# Patient Record
Sex: FEMALE | Race: BLACK OR AFRICAN AMERICAN | Employment: UNEMPLOYED | URBAN - METROPOLITAN AREA
[De-identification: names, ages, dates, MRNs, and addresses within clinical notes are randomized per-mention and may not be internally consistent; named-entity substitution may affect disease eponyms.]

---
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
## 2020-03-30 ENCOUNTER — HOSPITAL ENCOUNTER (EMERGENCY)
Age: 20
Discharge: HOME | End: 2020-03-30
Attending: EMERGENCY MEDICINE
Payer: SELF-PAY

## 2020-03-30 VITALS
HEART RATE: 93 BPM | OXYGEN SATURATION: 100 % | BODY MASS INDEX: 39.27 KG/M2 | TEMPERATURE: 97.9 F | HEIGHT: 64 IN | RESPIRATION RATE: 16 BRPM | WEIGHT: 230 LBS | SYSTOLIC BLOOD PRESSURE: 151 MMHG | DIASTOLIC BLOOD PRESSURE: 83 MMHG

## 2020-03-30 DIAGNOSIS — R82.81 PYURIA: Primary | ICD-10-CM

## 2020-03-30 LAB
BACTERIA URNS QL MICRO: 0 /HPF
CASTS URNS QL MICRO: ABNORMAL /LPF
EPI CELLS #/AREA URNS HPF: ABNORMAL /HPF
HCG UR QL: NEGATIVE
RBC #/AREA URNS HPF: >100 /HPF
WBC URNS QL MICRO: >100 /HPF

## 2020-03-30 PROCEDURE — 81015 MICROSCOPIC EXAM OF URINE: CPT

## 2020-03-30 PROCEDURE — 81003 URINALYSIS AUTO W/O SCOPE: CPT

## 2020-03-30 PROCEDURE — 81025 URINE PREGNANCY TEST: CPT

## 2020-03-30 PROCEDURE — 99283 EMERGENCY DEPT VISIT LOW MDM: CPT

## 2020-03-30 RX ORDER — CEPHALEXIN 500 MG/1
500 CAPSULE ORAL 3 TIMES DAILY
Qty: 21 CAP | Refills: 0 | Status: SHIPPED | OUTPATIENT
Start: 2020-03-30 | End: 2020-04-06

## 2020-03-30 RX ORDER — PHENAZOPYRIDINE HYDROCHLORIDE 200 MG/1
200 TABLET, FILM COATED ORAL 3 TIMES DAILY
Qty: 6 TAB | Refills: 0 | Status: SHIPPED | OUTPATIENT
Start: 2020-03-30 | End: 2020-04-01

## 2020-03-31 NOTE — DISCHARGE INSTRUCTIONS
Plenty of fluids  Pyridium for discomfort on voiding  Antibiotic: Keflex  Continued work-up if do not improve over the next several days

## 2020-03-31 NOTE — ED NOTES
I have reviewed discharge instructions with the patient. The patient verbalized understanding. Patient left ED via Discharge Method: ambulatory to Home with self. Opportunity for questions and clarification provided. Patient given 2 scripts. To continue your aftercare when you leave the hospital, you may receive an automated call from our care team to check in on how you are doing. This is a free service and part of our promise to provide the best care and service to meet your aftercare needs.  If you have questions, or wish to unsubscribe from this service please call [phone]. Thank you for Choosing our [facility] Emergency Department.

## 2020-03-31 NOTE — ED PROVIDER NOTES
The last 2 days she has had some discomfort and frequency associated with voiding. No associated fever. No flank pain. No history of recurring urinary tract infections. Denies any gynecologic issues. No concern of possible STD. No vaginal discharge. Delivered last fall is not breast-feeding. Had a normal menstrual period at the beginning of March. The history is provided by the patient. Bladder Infection    This is a new problem. There has been no fever. Associated symptoms include frequency and urgency. Pertinent negatives include no chills, no nausea, no vomiting, no hematuria, no hesitancy, no possible pregnancy and no flank pain. Her past medical history does not include kidney stones or recurrent UTIs. Past Medical History:   Diagnosis Date    DM [facility]diabetes mellitus) ([facility] [address]     Migraines     Pneumonia        Past Surgical History:   Procedure Laterality Date    HX HEENT      T&A/ tubes in ears         History reviewed. No pertinent family history.     Social History     Socioeconomic History    Marital status: SINGLE     Spouse name: Not on file    Number of children: Not on file    Years of education: Not on file    Highest education level: Not on file   Occupational History    Not on file   Social Needs    Financial resource strain: Not on file    Food insecurity     Worry: Not on file     Inability: Not on file    Transportation needs     Medical: Not on file     Non-medical: Not on file   Tobacco Use    Smoking status: Never Smoker    Smokeless tobacco: Never Used   Substance and Sexual Activity    Alcohol use: No    Drug use: No    Sexual activity: Not on file   Lifestyle    Physical activity     Days per week: Not on file     Minutes per session: Not on file    Stress: Not on file   Relationships    Social connections     Talks on phone: Not on file     Gets together: Not on file     Attends Sabianism service: Not on file     Active member of club or organization: Not on file     Attends meetings of clubs or organizations: Not on file     Relationship status: Not on file    Intimate partner violence     Fear of current or ex partner: Not on file     Emotionally abused: Not on file     Physically abused: Not on file     Forced sexual activity: Not on file   Other Topics Concern    Not on file   Social History Narrative    Not on file         ALLERGIES: Naproxen    Review of Systems   Constitutional: Negative for chills and fever. HENT: Negative. Gastrointestinal: Negative for nausea and vomiting. Genitourinary: Positive for frequency and urgency. Negative for flank pain, hematuria and hesitancy. Musculoskeletal: Negative. Neurological: Negative. Psychiatric/Behavioral: Negative for behavioral problems and confusion. All other systems reviewed and are negative. Vitals:    03/30/20 2019   BP: 151/83   Pulse: 93   Resp: 16   Temp: 97.9 °F (36.6 °C)   SpO2: 100%   Weight: 104.3 kg (230 lb)   Height: 5' 4\" (1.626 m)            Physical Exam  Vitals signs and nursing note reviewed. Constitutional:       General: She is not in acute distress. Appearance: Normal appearance. She is well-developed. She is not ill-appearing, toxic-appearing or diaphoretic. HENT:      Head: Atraumatic. Eyes:      General: No scleral icterus. Neck:      Musculoskeletal: Neck supple. Cardiovascular:      Rate and Rhythm: Normal rate. Pulmonary:      Effort: Pulmonary effort is normal. No respiratory distress. Abdominal:      General: Bowel sounds are normal.      Palpations: Abdomen is soft. Tenderness: There is no right CVA tenderness or left CVA tenderness. Musculoskeletal: Normal range of motion. Skin:     General: Skin is warm and dry. Neurological:      General: No focal deficit present. Mental Status: She is alert. Psychiatric:         Thought Content:  Thought content normal.          MDM  Number of Diagnoses or Management Options  Pyuria: Diagnosis management comments: Dysuria with hematuria and pyuria.  Will cover infectious potential though no bacteria noted   Aware to recheck with worsening       Amount and/or Complexity of Data Reviewed  Clinical lab tests: reviewed and ordered  Decide to obtain previous medical records or to obtain history from someone other than the patient: yes    Risk of Complications, Morbidity, and/or Mortality  Presenting problems: moderate  Diagnostic procedures: low  Management options: moderate    Patient Progress  Patient progress: stable         Procedures      Recent Results (from the past 12 hour(s))   HCG URINE, QL. - POC    Collection Time: 03/30/20  8:42 PM   Result Value Ref Range    Pregnancy test,urine (POC) NEGATIVE  NEG     URINE MICROSCOPIC    Collection Time: 03/30/20  8:43 PM   Result Value Ref Range    WBC >100 (H) 0 /hpf    RBC >100 (H) 0 /hpf    Epithelial cells 3-5 0 /hpf    Bacteria 0 0 /hpf    Casts 3-5 0 /lpf

## 2020-03-31 NOTE — ED TRIAGE NOTES
Pt c/o lower back pain and increased urinary frequency x3 days. Pt denies any burning or pain during urination.

## 2021-10-25 ENCOUNTER — APPOINTMENT (OUTPATIENT)
Dept: ULTRASOUND IMAGING | Age: 21
End: 2021-10-25
Attending: PHYSICIAN ASSISTANT

## 2021-10-25 ENCOUNTER — HOSPITAL ENCOUNTER (EMERGENCY)
Age: 21
Discharge: HOME | End: 2021-10-25
Attending: EMERGENCY MEDICINE

## 2021-10-25 VITALS
WEIGHT: 233 LBS | BODY MASS INDEX: 39.78 KG/M2 | OXYGEN SATURATION: 99 % | SYSTOLIC BLOOD PRESSURE: 142 MMHG | TEMPERATURE: 98.4 F | HEIGHT: 64 IN | DIASTOLIC BLOOD PRESSURE: 78 MMHG | RESPIRATION RATE: 16 BRPM | HEART RATE: 78 BPM

## 2021-10-25 DIAGNOSIS — R51.9 NONINTRACTABLE HEADACHE, UNSPECIFIED CHRONICITY PATTERN, UNSPECIFIED HEADACHE TYPE: Primary | ICD-10-CM

## 2021-10-25 DIAGNOSIS — N93.9 VAGINAL BLEEDING: ICD-10-CM

## 2021-10-25 LAB
ABO + RH BLD: NORMAL
BACTERIA URNS QL MICRO: 0 /HPF
BASOPHILS # BLD: 0 K/UL (ref 0–0.2)
BASOPHILS NFR BLD: 0 % (ref 0–2)
CASTS URNS QL MICRO: NORMAL /LPF
DIFFERENTIAL METHOD BLD: ABNORMAL
EOSINOPHIL # BLD: 0 K/UL (ref 0–0.8)
EOSINOPHIL NFR BLD: 0 % (ref 0.5–7.8)
EPI CELLS #/AREA URNS HPF: NORMAL /HPF
ERYTHROCYTE [DISTWIDTH] IN BLOOD BY AUTOMATED COUNT: 14.1 % (ref 11.9–14.6)
GLUCOSE BLD STRIP.AUTO-MCNC: 152 MG/DL (ref 65–100)
HCG SERPL-ACNC: <1 MIU/ML (ref 0–6)
HCT VFR BLD AUTO: 40.2 % (ref 35.8–46.3)
HGB BLD-MCNC: 13.1 G/DL (ref 11.7–15.4)
IMM GRANULOCYTES # BLD AUTO: 0 K/UL (ref 0–0.5)
IMM GRANULOCYTES NFR BLD AUTO: 0 % (ref 0–5)
LYMPHOCYTES # BLD: 2.1 K/UL (ref 0.5–4.6)
LYMPHOCYTES NFR BLD: 56 % (ref 13–44)
MCH RBC QN AUTO: 26.1 PG (ref 26.1–32.9)
MCHC RBC AUTO-ENTMCNC: 32.6 G/DL (ref 31.4–35)
MCV RBC AUTO: 80.2 FL (ref 79.6–97.8)
MONOCYTES # BLD: 0.2 K/UL (ref 0.1–1.3)
MONOCYTES NFR BLD: 5 % (ref 4–12)
NEUTS SEG # BLD: 1.5 K/UL (ref 1.7–8.2)
NEUTS SEG NFR BLD: 39 % (ref 43–78)
NRBC # BLD: 0 K/UL (ref 0–0.2)
PLATELET # BLD AUTO: 387 K/UL (ref 150–450)
PMV BLD AUTO: 11.3 FL (ref 9.4–12.3)
RBC # BLD AUTO: 5.01 M/UL (ref 4.05–5.2)
RBC #/AREA URNS HPF: NORMAL /HPF
SERVICE CMNT-IMP: ABNORMAL
WBC # BLD AUTO: 3.7 K/UL (ref 4.3–11.1)
WBC URNS QL MICRO: NORMAL /HPF

## 2021-10-25 PROCEDURE — 99284 EMERGENCY DEPT VISIT MOD MDM: CPT

## 2021-10-25 PROCEDURE — 86900 BLOOD TYPING SEROLOGIC ABO: CPT

## 2021-10-25 PROCEDURE — 84702 CHORIONIC GONADOTROPIN TEST: CPT

## 2021-10-25 PROCEDURE — 81015 MICROSCOPIC EXAM OF URINE: CPT

## 2021-10-25 PROCEDURE — 81003 URINALYSIS AUTO W/O SCOPE: CPT

## 2021-10-25 PROCEDURE — 82962 GLUCOSE BLOOD TEST: CPT

## 2021-10-25 PROCEDURE — 85025 COMPLETE CBC W/AUTO DIFF WBC: CPT

## 2021-10-25 NOTE — ED NOTES
I have reviewed discharge instructions with the patient. The patient verbalized understanding. Patient left ED via Discharge Method: ambulatory to Home with herself. Opportunity for questions and clarification provided. Patient given 0 scripts. To continue your aftercare when you leave the hospital, you may receive an automated call from our care team to check in on how you are doing. This is a free service and part of our promise to provide the best care and service to meet your aftercare needs.  If you have questions, or wish to unsubscribe from this service please call [phone]. Thank you for Choosing our [address] [place] [address] Emergency Department.

## 2021-10-25 NOTE — ED TRIAGE NOTES
C/o migraine this am- took meds with out relief.  Also states that she took a pregnancy test yesterday that was pos- 4 days late- started this am

## 2021-10-25 NOTE — DISCHARGE INSTRUCTIONS
Use tylenol as directed for headache see your primary care for routine recheck, avoid any unprotected sex

## 2021-10-25 NOTE — ED PROVIDER NOTES
Patient to ER complaining of migraine headache started this morning she took Tylenol with no relief. She states last menses 1 month ago it was 4 days late she had a positive pregnancy test couple days ago. She is . She denies any fever no nausea vomiting or diarrhea she has diabetic blood sugar has been well in the low 100s. She denies any abdominal pain    The history is provided by the patient. Headache   This is a recurrent problem. The current episode started 12 to 24 hours ago. The problem occurs constantly. The problem has not changed since onset. The headache is aggravated by an unknown factor. The pain is located in the generalized region. The pain is at a severity of 7/10. The pain is mild. Pertinent negatives include no fever, no weakness and no dizziness. She has tried acetaminophen for the symptoms. The treatment provided mild relief. Vaginal Bleeding  This is a new problem. The current episode started 6 to 12 hours ago. The problem occurs rarely. The problem has not changed since onset. Associated symptoms include headaches. Nothing aggravates the symptoms. Nothing relieves the symptoms. She has tried nothing for the symptoms. The treatment provided no relief. Past Medical History:   Diagnosis Date    DM (diabetes mellitus) ([facility] [address]     Migraines     Pneumonia        Past Surgical History:   Procedure Laterality Date    HX HEENT      T&A/ tubes in ears         No family history on file.     Social History     Socioeconomic History    Marital status: SINGLE     Spouse name: Not on file    Number of children: Not on file    Years of education: Not on file    Highest education level: Not on file   Occupational History    Not on file   Tobacco Use    Smoking status: Never Smoker    Smokeless tobacco: Never Used   Substance and Sexual Activity    Alcohol use: No    Drug use: No    Sexual activity: Not on file   Other Topics Concern    Not on file   Social History Narrative    Not on file     Social Determinants of Health     Financial Resource Strain:     Difficulty of Paying Living Expenses:    Food Insecurity:     Worried About Running Out of Food in the Last Year:     [address] in the Last Year:    Transportation Needs:     Lack of Transportation (Medical):  Lack of Transportation (Non-Medical):    Physical Activity:     Days of Exercise per Week:     Minutes of Exercise per Session:    Stress:     Feeling of Stress :    Social Connections:     Frequency of Communication with Friends and Family:     Frequency of Social Gatherings with Friends and Family:     Attends [employer] Services:     Active Member of Clubs or Organizations:     Attends Club or Organization Meetings:     Marital Status:    Intimate Partner Violence:     Fear of Current or Ex-Partner:     Emotionally Abused:     Physically Abused:     Sexually Abused: ALLERGIES: Naproxen    Review of Systems   Constitutional: Negative for fever. Genitourinary: Positive for vaginal bleeding. Neurological: Positive for headaches. Negative for dizziness and weakness. All other systems reviewed and are negative. Vitals:    10/25/21 1324   BP: (!) 147/84   Pulse: 85   Resp: 18   Temp: 98.8 °F (37.1 °C)   SpO2: 100%   Weight: 105.7 kg (233 lb)   Height: 5' 4\" (1.626 m)            Physical Exam  Vitals and nursing note reviewed. Constitutional:       General: She is not in acute distress. Appearance: Normal appearance. She is well-developed. She is obese. She is not diaphoretic. HENT:      Head: Normocephalic and atraumatic. Right Ear: External ear normal.      Left Ear: External ear normal.   Eyes:      Pupils: Pupils are equal, round, and reactive to light. Cardiovascular:      Rate and Rhythm: Normal rate and regular rhythm. Pulmonary:      Effort: Pulmonary effort is normal.      Breath sounds: Normal breath sounds.    Abdominal:      General: Bowel sounds are normal. Palpations: Abdomen is soft. Tenderness: There is no abdominal tenderness. Hernia: No hernia is present. Musculoskeletal:         General: Normal range of motion. Cervical back: Normal range of motion and neck supple. Skin:     General: Skin is warm. Neurological:      General: No focal deficit present. Mental Status: She is alert and oriented to person, place, and time. MDM  Number of Diagnoses or Management Options  Diagnosis management comments: Labs Reviewed  CBC WITH AUTOMATED DIFF - Abnormal; Notable for the following components:     WBC                           3.7 (*)                NEUTROPHILS                   39 (*)                 LYMPHOCYTES                   56 (*)                 EOSINOPHILS                   0 (*)                  ABS.  NEUTROPHILS              1.5 (*)             All other components within normal limits  GLUCOSE, POC - Abnormal; Notable for the following components:     Glucose (POC)                 152 (*)             All other components within normal limits  BETA HCG, QT  URINE MICROSCOPIC  BLOOD TYPE, (ABO+RH)      Patient unlikely starting her period, has to see primary care for routine recheck will give ibuprofen for headache       Amount and/or Complexity of Data Reviewed  Clinical lab tests: ordered and reviewed  Review and summarize past medical records: yes    Risk of Complications, Morbidity, and/or Mortality  Presenting problems: moderate  Diagnostic procedures: moderate  Management options: low    Patient Progress  Patient progress: improved         Procedures

## 2022-03-21 ENCOUNTER — TRANSCRIBE ORDER (OUTPATIENT)
Dept: SCHEDULING | Age: 22
End: 2022-03-21

## 2022-03-21 DIAGNOSIS — N63.10 MASS OF RIGHT BREAST: Primary | ICD-10-CM

## 2022-03-23 ENCOUNTER — TRANSCRIBE ORDER (OUTPATIENT)
Dept: SCHEDULING | Age: 22
End: 2022-03-23

## 2022-03-23 DIAGNOSIS — N63.0 LUMP OR MASS IN BREAST: Primary | ICD-10-CM

## 2022-04-12 ENCOUNTER — HOSPITAL ENCOUNTER (OUTPATIENT)
Dept: MAMMOGRAPHY | Age: 22
Discharge: HOME | End: 2022-04-12
Attending: INTERNAL MEDICINE
Payer: COMMERCIAL

## 2022-04-12 DIAGNOSIS — N63.0 LUMP OR MASS IN BREAST: ICD-10-CM

## 2022-04-12 PROCEDURE — 76642 ULTRASOUND BREAST LIMITED: CPT

## 2022-12-13 ENCOUNTER — HOSPITAL ENCOUNTER (EMERGENCY)
Dept: GENERAL RADIOLOGY | Age: 22
Discharge: HOME | End: 2022-12-16

## 2022-12-13 ENCOUNTER — HOSPITAL ENCOUNTER (EMERGENCY)
Age: 22
Discharge: HOME | End: 2022-12-13
Attending: EMERGENCY MEDICINE | Admitting: EMERGENCY MEDICINE

## 2022-12-13 VITALS
OXYGEN SATURATION: 100 % | BODY MASS INDEX: 39.27 KG/M2 | HEART RATE: 90 BPM | HEIGHT: 64 IN | WEIGHT: 230 LBS | DIASTOLIC BLOOD PRESSURE: 75 MMHG | TEMPERATURE: 98.2 F | SYSTOLIC BLOOD PRESSURE: 129 MMHG | RESPIRATION RATE: 16 BRPM

## 2022-12-13 DIAGNOSIS — U07.1 COVID-19 VIRUS INFECTION: Primary | ICD-10-CM

## 2022-12-13 DIAGNOSIS — R19.7 NAUSEA VOMITING AND DIARRHEA: ICD-10-CM

## 2022-12-13 DIAGNOSIS — R11.2 NAUSEA VOMITING AND DIARRHEA: ICD-10-CM

## 2022-12-13 LAB
ALBUMIN SERPL-MCNC: 3.3 G/DL (ref 3.5–5)
ALBUMIN/GLOB SERPL: 0.8 {RATIO} (ref 0.4–1.6)
ALP SERPL-CCNC: 70 U/L (ref 50–130)
ALT SERPL-CCNC: 22 U/L (ref 12–65)
ANION GAP SERPL CALC-SCNC: 7 MMOL/L (ref 2–11)
AST SERPL-CCNC: 20 U/L (ref 15–37)
BASOPHILS # BLD: 0 K/UL (ref 0–0.2)
BASOPHILS NFR BLD: 0 % (ref 0–2)
BILIRUB SERPL-MCNC: 0.5 MG/DL (ref 0.2–1.1)
BUN SERPL-MCNC: 11 MG/DL (ref 6–23)
CALCIUM SERPL-MCNC: 9.1 MG/DL (ref 8.3–10.4)
CHLORIDE SERPL-SCNC: 106 MMOL/L (ref 101–110)
CO2 SERPL-SCNC: 25 MMOL/L (ref 21–32)
CREAT SERPL-MCNC: 0.77 MG/DL (ref 0.6–1)
DIFFERENTIAL METHOD BLD: ABNORMAL
EOSINOPHIL # BLD: 0 K/UL (ref 0–0.8)
EOSINOPHIL NFR BLD: 0 % (ref 0.5–7.8)
ERYTHROCYTE [DISTWIDTH] IN BLOOD BY AUTOMATED COUNT: 13.3 % (ref 11.9–14.6)
FLUAV AG NPH QL IA: NEGATIVE
FLUBV AG NPH QL IA: NEGATIVE
GLOBULIN SER CALC-MCNC: 4 G/DL (ref 2.8–4.5)
GLUCOSE SERPL-MCNC: 220 MG/DL (ref 65–100)
HCG UR QL: NEGATIVE
HCT VFR BLD AUTO: 39.7 % (ref 35.8–46.3)
HGB BLD-MCNC: 12.7 G/DL (ref 11.7–15.4)
IMM GRANULOCYTES # BLD AUTO: 0 K/UL (ref 0–0.5)
IMM GRANULOCYTES NFR BLD AUTO: 0 % (ref 0–5)
LIPASE SERPL-CCNC: 155 U/L (ref 73–393)
LYMPHOCYTES # BLD: 2 K/UL (ref 0.5–4.6)
LYMPHOCYTES NFR BLD: 41 % (ref 13–44)
MAGNESIUM SERPL-MCNC: 2 MG/DL (ref 1.8–2.4)
MCH RBC QN AUTO: 26.1 PG (ref 26.1–32.9)
MCHC RBC AUTO-ENTMCNC: 32 G/DL (ref 31.4–35)
MCV RBC AUTO: 81.5 FL (ref 82–102)
MONOCYTES # BLD: 0.3 K/UL (ref 0.1–1.3)
MONOCYTES NFR BLD: 6 % (ref 4–12)
NEUTS SEG # BLD: 2.6 K/UL (ref 1.7–8.2)
NEUTS SEG NFR BLD: 52 % (ref 43–78)
NRBC # BLD: 0 K/UL (ref 0–0.2)
PLATELET # BLD AUTO: 244 K/UL (ref 150–450)
PMV BLD AUTO: 10 FL (ref 9.4–12.3)
POTASSIUM SERPL-SCNC: 4 MMOL/L (ref 3.5–5.1)
PROT SERPL-MCNC: 7.3 G/DL (ref 6.3–8.2)
RBC # BLD AUTO: 4.87 M/UL (ref 4.05–5.2)
SARS-COV-2 RDRP RESP QL NAA+PROBE: DETECTED
SODIUM SERPL-SCNC: 138 MMOL/L (ref 133–143)
SOURCE: ABNORMAL
SPECIMEN SOURCE: NORMAL
WBC # BLD AUTO: 4.9 K/UL (ref 4.3–11.1)

## 2022-12-13 PROCEDURE — 6360000002 HC RX W HCPCS: Performed by: NURSE PRACTITIONER

## 2022-12-13 PROCEDURE — 99284 EMERGENCY DEPT VISIT MOD MDM: CPT

## 2022-12-13 PROCEDURE — 87635 SARS-COV-2 COVID-19 AMP PRB: CPT

## 2022-12-13 PROCEDURE — 80053 COMPREHEN METABOLIC PANEL: CPT

## 2022-12-13 PROCEDURE — 2580000003 HC RX 258: Performed by: NURSE PRACTITIONER

## 2022-12-13 PROCEDURE — 6370000000 HC RX 637 (ALT 250 FOR IP): Performed by: NURSE PRACTITIONER

## 2022-12-13 PROCEDURE — 81025 URINE PREGNANCY TEST: CPT

## 2022-12-13 PROCEDURE — 96374 THER/PROPH/DIAG INJ IV PUSH: CPT

## 2022-12-13 PROCEDURE — 87804 INFLUENZA ASSAY W/OPTIC: CPT

## 2022-12-13 PROCEDURE — 71046 X-RAY EXAM CHEST 2 VIEWS: CPT

## 2022-12-13 PROCEDURE — 83735 ASSAY OF MAGNESIUM: CPT

## 2022-12-13 PROCEDURE — 83690 ASSAY OF LIPASE: CPT

## 2022-12-13 PROCEDURE — 85025 COMPLETE CBC W/AUTO DIFF WBC: CPT

## 2022-12-13 RX ORDER — LOPERAMIDE HYDROCHLORIDE 2 MG/1
2 TABLET ORAL 4 TIMES DAILY PRN
Qty: 12 TABLET | Refills: 0 | Status: SHIPPED | OUTPATIENT
Start: 2022-12-13 | End: 2022-12-16

## 2022-12-13 RX ORDER — LOPERAMIDE HYDROCHLORIDE 2 MG/1
2 CAPSULE ORAL
Status: COMPLETED | OUTPATIENT
Start: 2022-12-13 | End: 2022-12-13

## 2022-12-13 RX ORDER — ONDANSETRON 4 MG/1
4 TABLET, FILM COATED ORAL 3 TIMES DAILY PRN
Qty: 15 TABLET | Refills: 0 | Status: SHIPPED | OUTPATIENT
Start: 2022-12-13

## 2022-12-13 RX ORDER — ONDANSETRON 2 MG/ML
4 INJECTION INTRAMUSCULAR; INTRAVENOUS
Status: COMPLETED | OUTPATIENT
Start: 2022-12-13 | End: 2022-12-13

## 2022-12-13 RX ORDER — 0.9 % SODIUM CHLORIDE 0.9 %
500 INTRAVENOUS SOLUTION INTRAVENOUS
Status: COMPLETED | OUTPATIENT
Start: 2022-12-13 | End: 2022-12-13

## 2022-12-13 RX ADMIN — ONDANSETRON 4 MG: 2 INJECTION INTRAMUSCULAR; INTRAVENOUS at 12:49

## 2022-12-13 RX ADMIN — LOPERAMIDE HYDROCHLORIDE 2 MG: 2 CAPSULE ORAL at 12:48

## 2022-12-13 RX ADMIN — SODIUM CHLORIDE 500 ML: 9 INJECTION, SOLUTION INTRAVENOUS at 12:42

## 2022-12-13 ASSESSMENT — ENCOUNTER SYMPTOMS
COUGH: 1
DIARRHEA: 1
SORE THROAT: 1
ABDOMINAL PAIN: 0
NAUSEA: 1

## 2022-12-13 ASSESSMENT — PAIN - FUNCTIONAL ASSESSMENT: PAIN_FUNCTIONAL_ASSESSMENT: 0-10

## 2022-12-13 ASSESSMENT — PAIN DESCRIPTION - LOCATION: LOCATION: HEAD

## 2022-12-13 ASSESSMENT — PAIN SCALES - GENERAL: PAINLEVEL_OUTOF10: 5

## 2022-12-13 NOTE — Clinical Note
Rocio Kruse was seen and treated in our emergency department on 12/13/2022. COVID19 virus is suspected. Per the CDC guidelines we recommend home isolation until the following conditions are all met:    1. At least five days have passed since symptoms first appeared and/or had a close exposure,   2. After home isolation for five days, wearing a mask around others for the next five days,  3. At least 24 have passed since last fever without the use of fever-reducing medications and  4. Symptoms (eg cough, shortness of breath) have improved    If you have any questions or concerns, please don't hesitate to call.     She may return to work/school on 12/17/2022        Vilma Mills, TONI - CNP

## 2022-12-13 NOTE — ED PROVIDER NOTES
Emergency Department Provider Note                   PCP:                Loy Myers MD               Age: 20 y.o. Sex: female       ICD-10-CM    1. COVID-19 virus infection  U07.1       2. Nausea vomiting and diarrhea  R11.2     R19.7           DISPOSITION Discharge - Pending Orders Complete 12/13/2022 12:30:13 PM        MDM  Number of Diagnoses or Management Options  COVID-19 virus infection: new, needed workup  Nausea vomiting and diarrhea: new, needed workup  Diagnosis management comments: Overall well-appearing 26-year-old female presents emergency department today with complaint of nausea, vomiting, diarrhea, and cough. Patient appears in no acute distress. Lung sounds are clear. Abdomen is soft and nontender. Doubt surgical abdomen. She is afebrile. Heart rate is normal.  PERC negative. Symptoms consistent with viral etiology. Will check labs, urine, COVID, flu, and chest x-ray today. Labs are unremarkable. Urine is negative for indication of infection. Flu is negative. COVID is positive. Chest x-ray is negative for acute process. Supportive treatment discussed and encouraged. Red flag symptoms and return precautions discussed. Patient verbalizes understanding agreement with instructions, treatment plan, discharge.        Amount and/or Complexity of Data Reviewed  Clinical lab tests: ordered and reviewed  Tests in the medicine section of CPT®: reviewed and ordered  Review and summarize past medical records: yes    Risk of Complications, Morbidity, and/or Mortality  Presenting problems: low  Diagnostic procedures: low    Patient Progress  Patient progress: improved             Orders Placed This Encounter   Procedures    COVID-19, Rapid    Rapid influenza A/B antigens    XR CHEST (2 VW)    CBC with Auto Differential    Comprehensive Metabolic Panel    Lipase    Magnesium    Continuous Pulse Oximetry    POCT Urine Dipstick    POC Urine Preg (Select for females age 6-55)    POC Pregnancy Urine Qual    Saline lock IV        Medications   ondansetron (ZOFRAN) injection 4 mg (has no administration in time range)   0.9 % sodium chloride bolus (has no administration in time range)   loperamide (IMODIUM) capsule 2 mg (has no administration in time range)       New Prescriptions    LOPERAMIDE (IMODIUM A-D) 2 MG TABLET    Take 1 tablet by mouth 4 times daily as needed for Diarrhea    ONDANSETRON (ZOFRAN) 4 MG TABLET    Take 1 tablet by mouth 3 times daily as needed for Nausea or Vomiting        Cecil Garcia is a 20 y.o. female who presents to the Emergency Department with chief complaint of    Chief Complaint   Patient presents with    Pharyngitis    Headache      54-year-old female who presents emergency department today with complaint of nausea, vomiting, diarrhea, sore throat, and cough for 3 days. Patient reports some right-sided chest pain that occurs with a cough. She denies any abdominal pain, shortness of breath, or known fevers. She has been taking some Mucinex for her symptoms without relief. The history is provided by the patient. Nausea & Vomiting  Severity:  Moderate  Chronicity:  New  Recent urination:  Normal  Relieved by:  None tried  Worsened by:  Nothing  Ineffective treatments:  None tried  Associated symptoms: cough, diarrhea and sore throat    Associated symptoms: no abdominal pain, no chills, no fever, no headaches and no myalgias        Review of Systems   Constitutional:  Negative for chills and fever. HENT:  Positive for sore throat. Respiratory:  Positive for cough. Gastrointestinal:  Positive for diarrhea and nausea. Negative for abdominal pain. Musculoskeletal:  Negative for myalgias. Neurological:  Negative for headaches. All other systems reviewed and are negative.     Past Medical History:   Diagnosis Date    DM (diabetes mellitus) ([facility] [address]     Migraines     Pneumonia         Past Surgical History:   Procedure Laterality Date    HEENT T&A/ tubes in ears        History reviewed. No pertinent family history. Social History     Socioeconomic History    Marital status: Single     Spouse name: None    Number of children: None    Years of education: None    Highest education level: None   Tobacco Use    Smoking status: Never    Smokeless tobacco: Never   Vaping Use    Vaping Use: Never used   Substance and Sexual Activity    Alcohol use: No    Drug use: No         Naproxen     Previous Medications    METFORMIN (GLUCOPHAGE) 500 MG TABLET    Take 750 mg by mouth 2 times daily (with meals)        Vitals signs and nursing note reviewed. Patient Vitals for the past 4 hrs:   Temp Pulse Resp BP SpO2   12/13/22 0910 98.2 °F (36.8 °C) 88 16 128/89 100 %          Physical Exam  Vitals and nursing note reviewed. Constitutional:       General: She is not in acute distress. Appearance: Normal appearance. She is well-developed. She is not ill-appearing, toxic-appearing or diaphoretic. HENT:      Head: Normocephalic and atraumatic. Mouth/Throat:      Mouth: Mucous membranes are moist.   Eyes:      General: No scleral icterus. Extraocular Movements: Extraocular movements intact. Cardiovascular:      Rate and Rhythm: Normal rate. Heart sounds: Normal heart sounds. Pulmonary:      Effort: Pulmonary effort is normal. No respiratory distress. Breath sounds: Normal breath sounds. Abdominal:      General: Abdomen is flat. Bowel sounds are normal. There is no distension. Palpations: Abdomen is soft. Tenderness: There is no abdominal tenderness. Skin:     General: Skin is warm and dry. Capillary Refill: Capillary refill takes less than 2 seconds. Neurological:      General: No focal deficit present. Mental Status: She is alert and oriented to person, place, and time.    Psychiatric:         Mood and Affect: Mood normal.         Behavior: Behavior normal.        Procedures    Results for orders placed or performed during the hospital encounter of 12/13/22   COVID-19, Rapid    Specimen: Nasopharyngeal   Result Value Ref Range    Source Nasopharyngeal      SARS-CoV-2, Rapid Detected (AA) NOTD     Rapid influenza A/B antigens    Specimen: Nasal Washing   Result Value Ref Range    Influenza A Ag Negative NEG      Influenza B Ag Negative NEG      Source Nasopharyngeal     XR CHEST (2 VW)    Narrative    Exam: XR CHEST (2 VW) on 12/13/2022 10:39 AM    Clinical History: The Female patient is 20years old  presenting for cough,  chest pain. Comparison:  Chest x-ray 6/14/2018    Findings:  Frontal and lateral views of the chest were obtained. Lungs are clear without focal infiltrate or consolidation. No pleural effusions  are seen. The cardiomediastinal silhouette is within normal limits. There are  no acute osseous abnormalities. Impression    1. No acute cardiopulmonary process.     CPT code(s) 74060           CBC with Auto Differential   Result Value Ref Range    WBC 4.9 4.3 - 11.1 K/uL    RBC 4.87 4.05 - 5.2 M/uL    Hemoglobin 12.7 11.7 - 15.4 g/dL    Hematocrit 39.7 35.8 - 46.3 %    MCV 81.5 (L) 82.0 - 102.0 FL    MCH 26.1 26.1 - 32.9 PG    MCHC 32.0 31.4 - 35.0 g/dL    RDW 13.3 11.9 - 14.6 %    Platelets 135 866 - 368 K/uL    MPV 10.0 9.4 - 12.3 FL    nRBC 0.00 0.0 - 0.2 K/uL    Differential Type AUTOMATED      Seg Neutrophils 52 43 - 78 %    Lymphocytes 41 13 - 44 %    Monocytes 6 4.0 - 12.0 %    Eosinophils % 0 (L) 0.5 - 7.8 %    Basophils 0 0.0 - 2.0 %    Immature Granulocytes 0 0.0 - 5.0 %    Segs Absolute 2.6 1.7 - 8.2 K/UL    Absolute Lymph # 2.0 0.5 - 4.6 K/UL    Absolute Mono # 0.3 0.1 - 1.3 K/UL    Absolute Eos # 0.0 0.0 - 0.8 K/UL    Basophils Absolute 0.0 0.0 - 0.2 K/UL    Absolute Immature Granulocyte 0.0 0.0 - 0.5 K/UL   Comprehensive Metabolic Panel   Result Value Ref Range    Sodium 138 133 - 143 mmol/L    Potassium 4.0 3.5 - 5.1 mmol/L    Chloride 106 101 - 110 mmol/L    CO2 25 21 - 32 mmol/L    Anion Gap 7 2 - 11 mmol/L    Glucose 220 (H) 65 - 100 mg/dL    BUN 11 6 - 23 MG/DL    Creatinine 0.77 0.6 - 1.0 MG/DL    Est, Glom Filt Rate >60 >60 ml/min/1.73m2    Calcium 9.1 8.3 - 10.4 MG/DL    Total Bilirubin 0.5 0.2 - 1.1 MG/DL    ALT 22 12 - 65 U/L    AST 20 15 - 37 U/L    Alk Phosphatase 70 50 - 130 U/L    Total Protein 7.3 6.3 - 8.2 g/dL    Albumin 3.3 (L) 3.5 - 5.0 g/dL    Globulin 4.0 2.8 - 4.5 g/dL    Albumin/Globulin Ratio 0.8 0.4 - 1.6     Lipase   Result Value Ref Range    Lipase 155 73 - 393 U/L   Magnesium   Result Value Ref Range    Magnesium 2.0 1.8 - 2.4 mg/dL   POC Pregnancy Urine Qual   Result Value Ref Range    Preg Test, Ur Negative NEG          XR CHEST (2 VW)   Final Result      1. No acute cardiopulmonary process. CPT code(s) 76978                                      Voice dictation software was used during the making of this note. This software is not perfect and grammatical and other typographical errors may be present. This note has not been completely proofread for errors.        TONI Martínez - [facility]  12/13/22 [id number]

## 2022-12-13 NOTE — DISCHARGE INSTRUCTIONS
As we discussed, your COVID test is positive. Your labs all look normal today. Take medication as prescribed if needed. Make sure you are staying well-hydrated. Follow-up with your primary care provider for recheck of your symptoms. Return to the emergency department for any new, worsening, or concerning symptoms.

## 2023-02-14 ENCOUNTER — HOSPITAL ENCOUNTER (EMERGENCY)
Age: 23
Discharge: HOME | End: 2023-02-14
Attending: STUDENT IN AN ORGANIZED HEALTH CARE EDUCATION/TRAINING PROGRAM

## 2023-02-14 VITALS
DIASTOLIC BLOOD PRESSURE: 77 MMHG | TEMPERATURE: 99 F | OXYGEN SATURATION: 98 % | RESPIRATION RATE: 16 BRPM | SYSTOLIC BLOOD PRESSURE: 139 MMHG | HEART RATE: 79 BPM

## 2023-02-14 DIAGNOSIS — N39.0 URINARY TRACT INFECTION IN FEMALE: ICD-10-CM

## 2023-02-14 DIAGNOSIS — R51.9 ACUTE NONINTRACTABLE HEADACHE, UNSPECIFIED HEADACHE TYPE: Primary | ICD-10-CM

## 2023-02-14 LAB
ALBUMIN SERPL-MCNC: 3.4 G/DL (ref 3.5–5)
ALBUMIN/GLOB SERPL: 0.8 (ref 0.4–1.6)
ALP SERPL-CCNC: 66 U/L (ref 50–136)
ALT SERPL-CCNC: 21 U/L (ref 12–65)
ANION GAP SERPL CALC-SCNC: 5 MMOL/L (ref 2–11)
APPEARANCE UR: ABNORMAL
AST SERPL-CCNC: 18 U/L (ref 15–37)
BACTERIA URNS QL MICRO: ABNORMAL /HPF
BASOPHILS # BLD: 0 K/UL (ref 0–0.2)
BASOPHILS NFR BLD: 0 % (ref 0–2)
BILIRUB SERPL-MCNC: 0.5 MG/DL (ref 0.2–1.1)
BILIRUB UR QL: NEGATIVE
BUN SERPL-MCNC: 17 MG/DL (ref 6–23)
CALCIUM SERPL-MCNC: 9.6 MG/DL (ref 8.3–10.4)
CHLORIDE SERPL-SCNC: 104 MMOL/L (ref 101–110)
CO2 SERPL-SCNC: 27 MMOL/L (ref 21–32)
COLOR UR: ABNORMAL
CREAT SERPL-MCNC: 0.76 MG/DL (ref 0.6–1)
DIFFERENTIAL METHOD BLD: ABNORMAL
EOSINOPHIL # BLD: 0 K/UL (ref 0–0.8)
EOSINOPHIL NFR BLD: 1 % (ref 0.5–7.8)
EPI CELLS #/AREA URNS HPF: ABNORMAL /HPF
ERYTHROCYTE [DISTWIDTH] IN BLOOD BY AUTOMATED COUNT: 13.3 % (ref 11.9–14.6)
FLUAV RNA SPEC QL NAA+PROBE: NOT DETECTED
FLUBV RNA SPEC QL NAA+PROBE: NOT DETECTED
GLOBULIN SER CALC-MCNC: 4.3 G/DL (ref 2.8–4.5)
GLUCOSE SERPL-MCNC: 197 MG/DL (ref 65–100)
GLUCOSE UR STRIP.AUTO-MCNC: >1000 MG/DL
HCT VFR BLD AUTO: 39.7 % (ref 35.8–46.3)
HGB BLD-MCNC: 12.8 G/DL (ref 11.7–15.4)
HGB UR QL STRIP: NEGATIVE
IMM GRANULOCYTES # BLD AUTO: 0 K/UL (ref 0–0.5)
IMM GRANULOCYTES NFR BLD AUTO: 0 % (ref 0–5)
KETONES UR QL STRIP.AUTO: ABNORMAL MG/DL
LEUKOCYTE ESTERASE UR QL STRIP.AUTO: ABNORMAL
LIPASE SERPL-CCNC: 227 U/L (ref 73–393)
LYMPHOCYTES # BLD: 3.3 K/UL (ref 0.5–4.6)
LYMPHOCYTES NFR BLD: 63 % (ref 13–44)
MCH RBC QN AUTO: 26.1 PG (ref 26.1–32.9)
MCHC RBC AUTO-ENTMCNC: 32.2 G/DL (ref 31.4–35)
MCV RBC AUTO: 80.9 FL (ref 82–102)
MONOCYTES # BLD: 0.2 K/UL (ref 0.1–1.3)
MONOCYTES NFR BLD: 4 % (ref 4–12)
MUCOUS THREADS URNS QL MICRO: ABNORMAL /LPF
NEUTS SEG # BLD: 1.6 K/UL (ref 1.7–8.2)
NEUTS SEG NFR BLD: 32 % (ref 43–78)
NITRITE UR QL STRIP.AUTO: NEGATIVE
NRBC # BLD: 0 K/UL (ref 0–0.2)
PH UR STRIP: 6.5 (ref 5–9)
PLATELET # BLD AUTO: 265 K/UL (ref 150–450)
PMV BLD AUTO: 11.2 FL (ref 9.4–12.3)
POTASSIUM SERPL-SCNC: 3.8 MMOL/L (ref 3.5–5.1)
PROT SERPL-MCNC: 7.7 G/DL (ref 6.3–8.2)
PROT UR STRIP-MCNC: NEGATIVE MG/DL
RBC # BLD AUTO: 4.91 M/UL (ref 4.05–5.2)
RBC #/AREA URNS HPF: ABNORMAL /HPF
SARS-COV-2 RDRP RESP QL NAA+PROBE: NOT DETECTED
SODIUM SERPL-SCNC: 136 MMOL/L (ref 133–143)
SOURCE: NORMAL
SP GR UR REFRACTOMETRY: >1.035 (ref 1–1.02)
UROBILINOGEN UR QL STRIP.AUTO: 1 EU/DL (ref 0.2–1)
WBC # BLD AUTO: 5.2 K/UL (ref 4.3–11.1)
WBC URNS QL MICRO: ABNORMAL /HPF

## 2023-02-14 PROCEDURE — 99284 EMERGENCY DEPT VISIT MOD MDM: CPT

## 2023-02-14 PROCEDURE — 2580000003 HC RX 258: Performed by: NURSE PRACTITIONER

## 2023-02-14 PROCEDURE — 6360000002 HC RX W HCPCS: Performed by: NURSE PRACTITIONER

## 2023-02-14 PROCEDURE — 6370000000 HC RX 637 (ALT 250 FOR IP): Performed by: NURSE PRACTITIONER

## 2023-02-14 PROCEDURE — 96375 TX/PRO/DX INJ NEW DRUG ADDON: CPT

## 2023-02-14 PROCEDURE — 96374 THER/PROPH/DIAG INJ IV PUSH: CPT

## 2023-02-14 PROCEDURE — 81001 URINALYSIS AUTO W/SCOPE: CPT

## 2023-02-14 PROCEDURE — 80053 COMPREHEN METABOLIC PANEL: CPT

## 2023-02-14 PROCEDURE — 87635 SARS-COV-2 COVID-19 AMP PRB: CPT

## 2023-02-14 PROCEDURE — 83690 ASSAY OF LIPASE: CPT

## 2023-02-14 PROCEDURE — 85025 COMPLETE CBC W/AUTO DIFF WBC: CPT

## 2023-02-14 PROCEDURE — 87502 INFLUENZA DNA AMP PROBE: CPT

## 2023-02-14 RX ORDER — METOCLOPRAMIDE HYDROCHLORIDE 5 MG/ML
10 INJECTION INTRAMUSCULAR; INTRAVENOUS
Status: COMPLETED | OUTPATIENT
Start: 2023-02-14 | End: 2023-02-14

## 2023-02-14 RX ORDER — DIPHENHYDRAMINE HYDROCHLORIDE 50 MG/ML
25 INJECTION INTRAMUSCULAR; INTRAVENOUS
Status: COMPLETED | OUTPATIENT
Start: 2023-02-14 | End: 2023-02-14

## 2023-02-14 RX ORDER — 0.9 % SODIUM CHLORIDE 0.9 %
1000 INTRAVENOUS SOLUTION INTRAVENOUS ONCE
Status: COMPLETED | OUTPATIENT
Start: 2023-02-14 | End: 2023-02-14

## 2023-02-14 RX ORDER — CEPHALEXIN 500 MG/1
500 CAPSULE ORAL 2 TIMES DAILY
Qty: 14 CAPSULE | Refills: 0 | Status: SHIPPED | OUTPATIENT
Start: 2023-02-14 | End: 2023-02-21

## 2023-02-14 RX ORDER — DEXAMETHASONE SODIUM PHOSPHATE 10 MG/ML
8 INJECTION, SOLUTION INTRAMUSCULAR; INTRAVENOUS
Status: COMPLETED | OUTPATIENT
Start: 2023-02-14 | End: 2023-02-14

## 2023-02-14 RX ORDER — BUTALBITAL, ACETAMINOPHEN AND CAFFEINE 50; 325; 40 MG/1; MG/1; MG/1
1 TABLET ORAL
Status: COMPLETED | OUTPATIENT
Start: 2023-02-14 | End: 2023-02-14

## 2023-02-14 RX ADMIN — DIPHENHYDRAMINE HYDROCHLORIDE 25 MG: 50 INJECTION, SOLUTION INTRAMUSCULAR; INTRAVENOUS at 14:29

## 2023-02-14 RX ADMIN — METOCLOPRAMIDE 10 MG: 5 INJECTION, SOLUTION INTRAMUSCULAR; INTRAVENOUS at 14:29

## 2023-02-14 RX ADMIN — DEXAMETHASONE SODIUM PHOSPHATE 8 MG: 10 INJECTION, SOLUTION INTRAMUSCULAR; INTRAVENOUS at 14:29

## 2023-02-14 RX ADMIN — BUTALBITAL, ACETAMINOPHEN, AND CAFFEINE 1 TABLET: 50; 325; 40 TABLET ORAL at 14:29

## 2023-02-14 RX ADMIN — SODIUM CHLORIDE 1000 ML: 9 INJECTION, SOLUTION INTRAVENOUS at 14:28

## 2023-02-14 ASSESSMENT — PAIN SCALES - GENERAL
PAINLEVEL_OUTOF10: 7
PAINLEVEL_OUTOF10: 7

## 2023-02-14 ASSESSMENT — PAIN - FUNCTIONAL ASSESSMENT: PAIN_FUNCTIONAL_ASSESSMENT: 0-10

## 2023-02-14 NOTE — ED NOTES
Medicated as ordered with [facility] teaching, IV site intact.         John Aguiar RN  02/14/23 [id number]

## 2023-02-14 NOTE — ED NOTES
Pt states history of migraines, denies aura, states sometimes sees spots. C/o HA right temple with nausea x2-3 days. Pupils WNL.       Araceli Goodwin, RN  02/14/23 3610

## 2023-02-14 NOTE — ED PROVIDER NOTES
Emergency Department Provider Note                   PCP:                Reynold Kothari MD               Age: 24 y.o. Sex: female       ICD-10-CM    1. Acute nonintractable headache, unspecified headache type  R51.9       2. Urinary tract infection in female  N39.0           DISPOSITION Decision To Discharge 02/14/2023 03:35:43 PM       Medical Decision Making  Amount and/or Complexity of Data Reviewed  Labs: ordered. Risk  Prescription drug management. In HPI. Pleasant well-appearing female in the ED complaining of headache, consistent with her typical experience migraines, endorse nausea, photophobia, denies urinary complaint and all other complaint. She has no neurodeficits. She has no meningeal signs, abdomen soft nontender, no cough, shortness of breath, chest pain or other complaint. Offered migraine cocktail and IV fluids which were delivered. Patient with negative urine hCG, negative COVID and flu testing reassuring labs. UA consistent with UTI which likely is contributing to how poorly she feels. Following migraine cocktail, states that her symptoms are completely resolved might be discharged home. She was given strict return precautions, advised to return for new or worsening symptoms, will prescribe antibiotics for UTI. Patient is well-hydrated appearing, no distress. Nontoxic-appearing, tolerating oral intake, hemodynamically stable. All findings and plan were discussed with the patient. All questions answered. Discussed with the patient that an unremarkable evaluation in the ED does not preclude the development or presence of a serious or life threatening condition. Patient was instructed to return immediately for any worsening or change in current symptoms, or if symptoms do not continue to improve.  I instructed them to follow up with their primary care provider, own specialist, or medical provider that I am recommending for him within the next 2-3 days  The patient acknowledged understanding plan of care and affirmed approval.     Signed by: ANDREW Ac     This note created using Dragon voice recognition software.  Please excuse any accidental errors associated with its use, as note has not been fully proofread and edited.      Complexity of Problem: 1 undiagnosed new problem with uncertain prognosis. (4)  The patients assessment required an independent historian: I spoke with a family member.  I have conducted an independent ordering and review of Labs.  I have reviewed records from an external source: ED records from outside this hospital.  Considerations: Shared decision making was utilized in the care of this patient.   Social determinant affecting care: inability to see PCP due to long work hours    We discussed care recommended by provider that patient declined (tests, disposition, etc).  Patient was discharged risks and benefits of hospitalization were discussed.         Orders Placed This Encounter   Procedures    COVID-19, Rapid    Influenza A/B, Molecular    CBC with Diff    CMP    Lipase    Urinalysis w rflx microscopic    Diet NPO    POCT Urine Dipstick    Nonrebreather mask oxygen    POC Pregnancy Urine Qual    Saline lock IV        Medications   0.9 % sodium chloride bolus (1,000 mLs IntraVENous New Bag 2/14/23 1428)   metoclopramide (REGLAN) injection 10 mg (10 mg IntraVENous Given 2/14/23 1429)   diphenhydrAMINE (BENADRYL) injection 25 mg (25 mg IntraVENous Given 2/14/23 1429)   butalbital-acetaminophen-caffeine (FIORICET, ESGIC) per tablet 1 tablet (1 tablet Oral Given 2/14/23 1429)   dexamethasone (PF) (DECADRON) injection 8 mg (8 mg IntraVENous Given 2/14/23 1429)       New Prescriptions    CEPHALEXIN (KEFLEX) 500 MG CAPSULE    Take 1 capsule by mouth 2 times daily for 7 days        Trinidad Cifuentes is a 23 y.o. female who presents to the Emergency Department with chief complaint of    Chief Complaint   Patient presents with    Headache     HPI  Pleasant and very well-appearing 74-year-old female presents to the ED with complaint of headache x2-3 days as well as nausea with no vomiting, that began today. States she has a history of migraines and that her symptoms today are consistent with a symptoms she experiences with her regularly occurring migraine headaches. She has not taken any therapeutic measures in recent days. She denies worsening of life, thunderclap onset. Denies numbness/tingling's weakness, neck pain or stiffness, visual change, fall, head injury, concern for pregnancy and all other complaint. She is quite well-appearing, pleasant, ambulatory and appears in no acute distress. Review of Systems  Constitutional: As in HPI  HENT: As in HPI     Eyes: Negative   Respiratory: Negative  Cardiovascular: Negative  GI/: As in HPI  Musculoskeletal: Negative   Skin: Negative     Allergic/Immunologic: Negative  Neurological: As in HPI              Past Medical History:   Diagnosis Date    DM (diabetes mellitus) ([facility] [address]     Migraines     Pneumonia         Past Surgical History:   Procedure Laterality Date    HEENT      T&A/ tubes in ears        History reviewed. No pertinent family history. Social History     Socioeconomic History    Marital status: Single     Spouse name: None    Number of children: None    Years of education: None    Highest education level: None   Tobacco Use    Smoking status: Never    Smokeless tobacco: Never   Vaping Use    Vaping Use: Never used   Substance and Sexual Activity    Alcohol use: No    Drug use: No        Allergies: Naproxen    Previous Medications    METFORMIN (GLUCOPHAGE) 500 MG TABLET    Take 750 mg by mouth 2 times daily (with meals)    ONDANSETRON (ZOFRAN) 4 MG TABLET    Take 1 tablet by mouth 3 times daily as needed for Nausea or Vomiting        Vitals signs and nursing note reviewed.    Patient Vitals for the past 4 hrs:   Temp Pulse Resp BP SpO2   02/14/23 1252 -- -- -- -- 98 %   02/14/23 1237 99 °F (37.2 °C) 79 16 139/77 --          Physical Exam   Constitutional: Oriented to person, place, and time. Appears well-developed and well-nourished. No distress. HENT:    Head: Normocephalic and atraumatic   Right Ear: External ear normal.    Left Ear: External ear normal.     Nose: Nose normal.   Mouth/Throat: Mouth normal.    Eyes: Conjunctivae are normal.   Neck: Supple. No tracheal deviation. Cardiovascular: Normal rate, intact distal pulses. Brisk capillary refill intact, less than 2 seconds. Regular rhythm present. No pitting edema. Pulmonary/Chest: Lungs are clear & equal bilaterally. No adventitious sounds. No respiratory distress. Abdominal: Soft. There is no tenderness. No guarding rebound or rigidity. Musculoskeletal: No obvious deformity, erythema, edema. Neurological: Cranial nerves are grossly intact. No focal deficits. Alert and oriented to person, place, and time. No numbness/tingling. No loss of sensation. Positive PMS ×4. GCS= 15. Normal tandem gait. Skin: Skin is warm and dry. Capillary refill takes less than 2 seconds. No abrasion, no lesion, no petechiae and no rash noted. Not diaphoretic. No cyanosis, erythema, or pallor. Psychiatric: Normal mood and affect. Behavior is normal.    Nursing note and vitals reviewed. Procedures    Is this patient to be included in the SEP-1 core measure due to severe sepsis or septic shock?  No Exclusion criteria - the patient is NOT to be included for SEP-1 Core Measure due to: 2+ SIRS criteria are not met     Results for orders placed or performed during the hospital encounter of 02/14/23   COVID-19, Rapid    Specimen: Nasopharyngeal   Result Value Ref Range    Source Nasopharyngeal      SARS-CoV-2, Rapid Not detected NOTD     Influenza A/B, Molecular    Specimen: Nasal   Result Value Ref Range    Influenza A, [place] Not detected      Influenza B, [place] Not detected     CBC with Diff   Result Value Ref Range    WBC 5.2 4.3 - 11.1 K/uL RBC 4.91 4.05 - 5.2 M/uL    Hemoglobin 12.8 11.7 - 15.4 g/dL    Hematocrit 39.7 35.8 - 46.3 %    MCV 80.9 (L) 82.0 - 102.0 FL    MCH 26.1 26.1 - 32.9 PG    MCHC 32.2 31.4 - 35.0 g/dL    RDW 13.3 11.9 - 14.6 %    Platelets 697 168 - 964 K/uL    MPV 11.2 9.4 - 12.3 FL    nRBC 0.00 0.0 - 0.2 K/uL    Differential Type AUTOMATED      Seg Neutrophils 32 (L) 43 - 78 %    Lymphocytes 63 (H) 13 - 44 %    Monocytes 4 4.0 - 12.0 %    Eosinophils % 1 0.5 - 7.8 %    Basophils 0 0.0 - 2.0 %    Immature Granulocytes 0 0.0 - 5.0 %    Segs Absolute 1.6 (L) 1.7 - 8.2 K/UL    Absolute Lymph # 3.3 0.5 - 4.6 K/UL    Absolute Mono # 0.2 0.1 - 1.3 K/UL    Absolute Eos # 0.0 0.0 - 0.8 K/UL    Basophils Absolute 0.0 0.0 - 0.2 K/UL    Absolute Immature Granulocyte 0.0 0.0 - 0.5 K/UL   CMP   Result Value Ref Range    Sodium 136 133 - 143 mmol/L    Potassium 3.8 3.5 - 5.1 mmol/L    Chloride 104 101 - 110 mmol/L    CO2 27 21 - 32 mmol/L    Anion Gap 5 2 - 11 mmol/L    Glucose 197 (H) 65 - 100 mg/dL    BUN 17 6 - 23 MG/DL    Creatinine 0.76 0.6 - 1.0 MG/DL    Est, Glom Filt Rate >60 >60 ml/min/1.73m2    Calcium 9.6 8.3 - 10.4 MG/DL    Total Bilirubin 0.5 0.2 - 1.1 MG/DL    ALT 21 12 - 65 U/L    AST 18 15 - 37 U/L    Alk Phosphatase 66 50 - 136 U/L    Total Protein 7.7 6.3 - 8.2 g/dL    Albumin 3.4 (L) 3.5 - 5.0 g/dL    Globulin 4.3 2.8 - 4.5 g/dL    Albumin/Globulin Ratio 0.8 0.4 - 1.6     Lipase   Result Value Ref Range    Lipase 227 73 - 393 U/L   Urinalysis w rflx microscopic   Result Value Ref Range    Color, UA YELLOW/STRAW      Appearance CLOUDY      Specific Gravity, UA >1.035 (H) 1.001 - 1.023    pH, Urine 6.5 5.0 - 9.0      Protein, UA Negative NEG mg/dL    Glucose, UA >1000 mg/dL    Ketones, Urine TRACE (A) NEG mg/dL    Bilirubin Urine Negative NEG      Blood, Urine Negative NEG      Urobilinogen, Urine 1.0 0.2 - 1.0 EU/dL    Nitrite, Urine Negative NEG      Leukocyte Esterase, Urine MODERATE (A) NEG      WBC, UA 10-20 0 /hpf    RBC, UA 5-10 0 /hpf    Epithelial Cells UA 3-5 0 /hpf    BACTERIA, URINE 2+ (H) 0 /hpf    Mucus, UA 2+ (H) 0 /lpf        No orders to display                         Voice dictation software was used during the making of this note. This software is not perfect and grammatical and other typographical errors may be present. This note has not been completely proofread for errors.      Nilda Merino, TONI - CNP  02/14/23 [id number]

## 2023-02-14 NOTE — ED TRIAGE NOTES
Pt ambulatory to triage with c/o headache with N/V X2-3 days. Pt reports history of migraines. Pt reports she is currently not taking any migraine medication.

## 2023-02-14 NOTE — Clinical Note
Tanesha Casiano accompanied Todd Denise to the emergency department on 2/14/2023. They may return to work on 02/15/2023. If you have any questions or concerns, please don't hesitate to call.       Kirit Badillo, TONI - CNP

## 2025-09-01 ENCOUNTER — HOSPITAL ENCOUNTER (EMERGENCY)
Age: 25
Discharge: HOME | End: 2025-09-01
Payer: COMMERCIAL

## 2025-09-01 VITALS
TEMPERATURE: 98.6 F | WEIGHT: 236 LBS | HEIGHT: 64 IN | HEART RATE: 95 BPM | SYSTOLIC BLOOD PRESSURE: 149 MMHG | DIASTOLIC BLOOD PRESSURE: 89 MMHG | RESPIRATION RATE: 16 BRPM | BODY MASS INDEX: 40.29 KG/M2 | OXYGEN SATURATION: 100 %

## 2025-09-01 DIAGNOSIS — J06.9 UPPER RESPIRATORY TRACT INFECTION, UNSPECIFIED TYPE: Primary | ICD-10-CM

## 2025-09-01 LAB
FLUAV RNA SPEC QL NAA+PROBE: NOT DETECTED
FLUBV RNA SPEC QL NAA+PROBE: NOT DETECTED
SARS-COV-2 RDRP RESP QL NAA+PROBE: NOT DETECTED
SOURCE: NORMAL

## 2025-09-01 PROCEDURE — 99283 EMERGENCY DEPT VISIT LOW MDM: CPT

## 2025-09-01 PROCEDURE — 6370000000 HC RX 637 (ALT 250 FOR IP): Performed by: PHYSICIAN ASSISTANT

## 2025-09-01 PROCEDURE — 87636 SARSCOV2 & INF A&B AMP PRB: CPT

## 2025-09-01 RX ORDER — BENZONATATE 100 MG/1
200 CAPSULE ORAL
Status: COMPLETED | OUTPATIENT
Start: 2025-09-01 | End: 2025-09-01

## 2025-09-01 RX ORDER — BENZONATATE 100 MG/1
200 CAPSULE ORAL 3 TIMES DAILY PRN
Qty: 60 CAPSULE | Refills: 0 | Status: SHIPPED | OUTPATIENT
Start: 2025-09-01 | End: 2025-10-13

## 2025-09-01 RX ADMIN — BENZONATATE 200 MG: 100 CAPSULE ORAL at 16:41

## 2025-09-01 ASSESSMENT — PAIN SCALES - GENERAL: PAINLEVEL_OUTOF10: 7

## 2025-09-01 ASSESSMENT — PAIN - FUNCTIONAL ASSESSMENT: PAIN_FUNCTIONAL_ASSESSMENT: 0-10
